# Patient Record
Sex: FEMALE | Race: WHITE | NOT HISPANIC OR LATINO | URBAN - NONMETROPOLITAN AREA
[De-identification: names, ages, dates, MRNs, and addresses within clinical notes are randomized per-mention and may not be internally consistent; named-entity substitution may affect disease eponyms.]

---

## 2023-03-01 ENCOUNTER — ON CAMPUS - OUTPATIENT (OUTPATIENT)
Dept: URBAN - NONMETROPOLITAN AREA HOSPITAL 34 | Facility: HOSPITAL | Age: 33
End: 2023-03-01
Payer: COMMERCIAL

## 2023-03-01 DIAGNOSIS — K31.89 OTHER DISEASES OF STOMACH AND DUODENUM: ICD-10-CM

## 2023-03-01 PROCEDURE — 43239 EGD BIOPSY SINGLE/MULTIPLE: CPT | Performed by: INTERNAL MEDICINE

## 2023-05-11 ENCOUNTER — OFFICE (OUTPATIENT)
Dept: URBAN - NONMETROPOLITAN AREA CLINIC 1 | Facility: CLINIC | Age: 33
End: 2023-05-11
Payer: COMMERCIAL

## 2023-05-11 VITALS
WEIGHT: 140 LBS | SYSTOLIC BLOOD PRESSURE: 116 MMHG | HEIGHT: 60 IN | HEART RATE: 87 BPM | DIASTOLIC BLOOD PRESSURE: 75 MMHG

## 2023-05-11 DIAGNOSIS — B18.2 CHRONIC VIRAL HEPATITIS C: ICD-10-CM

## 2023-05-11 PROCEDURE — 99213 OFFICE O/P EST LOW 20 MIN: CPT | Performed by: NURSE PRACTITIONER

## 2023-05-11 NOTE — SERVICENOTES
Her Rx was sent to [facility].
She knows to contact me when she receives her medication. I need to see her 1 month after starting treatment.

## 2023-05-11 NOTE — SERVICEHPINOTES
In today to initiate treatment for chronic hepatitis C. She was initially seen by [facility], and has had her preliminary work-up done. She is genotype Ia, treatment naïve, no cirrhosis, hepatitis B and HIV are negative. She is taking omeprazole and I have reminded her that she will need to change the time of her dosing (4 hours apart).  She will stop taking the Carafate, she says hasn't needed it.    Current suboxone treatment.

## 2023-05-12 RX ORDER — VELPATASVIR AND SOFOSBUVIR 100; 400 MG/1; MG/1
400 TABLET, FILM COATED ORAL
Qty: 28 | Refills: 2 | Status: ACTIVE
Start: 2023-05-12

## 2023-06-26 ENCOUNTER — OFFICE (OUTPATIENT)
Dept: URBAN - NONMETROPOLITAN AREA CLINIC 1 | Facility: CLINIC | Age: 33
End: 2023-06-26
Payer: COMMERCIAL

## 2023-06-26 VITALS
HEIGHT: 60 IN | WEIGHT: 140 LBS | HEART RATE: 75 BPM | SYSTOLIC BLOOD PRESSURE: 80 MMHG | DIASTOLIC BLOOD PRESSURE: 60 MMHG

## 2023-06-26 DIAGNOSIS — B18.2 CHRONIC VIRAL HEPATITIS C: ICD-10-CM

## 2023-06-26 DIAGNOSIS — K21.9 GASTRO-ESOPHAGEAL REFLUX DISEASE WITHOUT ESOPHAGITIS: ICD-10-CM

## 2023-06-26 PROCEDURE — 99213 OFFICE O/P EST LOW 20 MIN: CPT | Performed by: NURSE PRACTITIONER

## 2023-06-26 NOTE — SERVICEHPINOTES
She is at the end of her first month with Epclusa, and doing very well. She has had a little bit of GI upset and felt irritable for the first week or so. Otherwise, she feels good. She takes her Epclusa at bedtime, and her omeprazole first thing in the morning. She likes [facility]. I will check her CBC and CMP today, otherwise continue current plan of care.

## 2023-06-27 LAB
CBC, PLATELET, NO DIFFERENTIAL: HEMATOCRIT: 42 % (ref 34–46.6)
CBC, PLATELET, NO DIFFERENTIAL: HEMOGLOBIN: 14.4 G/DL (ref 11.1–15.9)
CBC, PLATELET, NO DIFFERENTIAL: MCH: 34.4 PG — HIGH (ref 26.6–33)
CBC, PLATELET, NO DIFFERENTIAL: MCHC: 34.3 G/DL (ref 31.5–35.7)
CBC, PLATELET, NO DIFFERENTIAL: MCV: 100 FL — HIGH (ref 79–97)
CBC, PLATELET, NO DIFFERENTIAL: NRBC: (no result)
CBC, PLATELET, NO DIFFERENTIAL: PLATELETS: 220 X10E3/UL (ref 150–450)
CBC, PLATELET, NO DIFFERENTIAL: RBC: 4.19 X10E6/UL (ref 3.77–5.28)
CBC, PLATELET, NO DIFFERENTIAL: RDW: 11.8 % (ref 11.7–15.4)
CBC, PLATELET, NO DIFFERENTIAL: WBC: 7 X10E3/UL (ref 3.4–10.8)
COMP. METABOLIC PANEL (14): A/G RATIO: 1.9 (ref 1.2–2.2)
COMP. METABOLIC PANEL (14): ALBUMIN: 4.5 G/DL (ref 3.8–4.8)
COMP. METABOLIC PANEL (14): ALKALINE PHOSPHATASE: 76 IU/L (ref 44–121)
COMP. METABOLIC PANEL (14): ALT (SGPT): 11 IU/L (ref 0–32)
COMP. METABOLIC PANEL (14): AST (SGOT): 24 IU/L (ref 0–40)
COMP. METABOLIC PANEL (14): BILIRUBIN, TOTAL: 0.2 MG/DL (ref 0–1.2)
COMP. METABOLIC PANEL (14): BUN/CREATININE RATIO: 15 (ref 9–23)
COMP. METABOLIC PANEL (14): BUN: 11 MG/DL (ref 6–20)
COMP. METABOLIC PANEL (14): CALCIUM: 9.7 MG/DL (ref 8.7–10.2)
COMP. METABOLIC PANEL (14): CARBON DIOXIDE, TOTAL: 24 MMOL/L (ref 20–29)
COMP. METABOLIC PANEL (14): CHLORIDE: 105 MMOL/L (ref 96–106)
COMP. METABOLIC PANEL (14): CREATININE: 0.75 MG/DL (ref 0.57–1)
COMP. METABOLIC PANEL (14): EGFR: 108 ML/MIN/1.73 (ref 59–?)
COMP. METABOLIC PANEL (14): GLOBULIN, TOTAL: 2.4 G/DL (ref 1.5–4.5)
COMP. METABOLIC PANEL (14): GLUCOSE: 65 MG/DL — LOW (ref 70–99)
COMP. METABOLIC PANEL (14): POTASSIUM: 5.2 MMOL/L (ref 3.5–5.2)
COMP. METABOLIC PANEL (14): PROTEIN, TOTAL: 6.9 G/DL (ref 6–8.5)
COMP. METABOLIC PANEL (14): SODIUM: 144 MMOL/L (ref 134–144)

## 2023-09-20 ENCOUNTER — OFFICE (OUTPATIENT)
Dept: URBAN - NONMETROPOLITAN AREA CLINIC 1 | Facility: CLINIC | Age: 33
End: 2023-09-20
Payer: COMMERCIAL

## 2023-09-20 VITALS
HEIGHT: 60 IN | HEART RATE: 65 BPM | DIASTOLIC BLOOD PRESSURE: 66 MMHG | WEIGHT: 135 LBS | SYSTOLIC BLOOD PRESSURE: 111 MMHG

## 2023-09-20 DIAGNOSIS — B18.2 CHRONIC VIRAL HEPATITIS C: ICD-10-CM

## 2023-09-20 PROCEDURE — 99212 OFFICE O/P EST SF 10 MIN: CPT | Performed by: NURSE PRACTITIONER

## 2023-09-20 NOTE — SERVICEHPINOTES
No charge for today.  My plan was to have her back end-November, but she was told by nursing to come in today.  She has completed her Epclusa, will be due for SVR end- November.

## 2023-11-27 ENCOUNTER — OFFICE (OUTPATIENT)
Dept: URBAN - NONMETROPOLITAN AREA CLINIC 1 | Facility: CLINIC | Age: 33
End: 2023-11-27
Payer: COMMERCIAL

## 2023-11-27 VITALS
HEART RATE: 76 BPM | HEIGHT: 60 IN | WEIGHT: 132 LBS | SYSTOLIC BLOOD PRESSURE: 124 MMHG | DIASTOLIC BLOOD PRESSURE: 74 MMHG

## 2023-11-27 DIAGNOSIS — G43.909 MIGRAINE, UNSPECIFIED, NOT INTRACTABLE, WITHOUT STATUS MIGRA: ICD-10-CM

## 2023-11-27 DIAGNOSIS — K21.9 GASTRO-ESOPHAGEAL REFLUX DISEASE WITHOUT ESOPHAGITIS: ICD-10-CM

## 2023-11-27 DIAGNOSIS — B18.2 CHRONIC VIRAL HEPATITIS C: ICD-10-CM

## 2023-11-27 PROCEDURE — 99213 OFFICE O/P EST LOW 20 MIN: CPT | Performed by: NURSE PRACTITIONER

## 2023-11-27 NOTE — SERVICENOTES
She does not have cirrhosis.   If her PCR is negative, she will be cured, and will not need further follow up.

## 2023-11-27 NOTE — SERVICEHPINOTES
She comes in today for a follow-up of her blood work.  She finished 12 weeks of Epclusa for chronic hepatitis-C and is due now to check for SVR.  She feels well,  finished her prescription in August.  Had very few or no side effects from treatment. her chronic illnesses include GERD, and migraine headaches.

## 2023-11-29 LAB
HCV RT-PCR, QUANT (NON-GRAPH): HCV LOG10: (no result)
HCV RT-PCR, QUANT (NON-GRAPH): HEPATITIS C QUANTITATION: (no result) IU/ML
HCV RT-PCR, QUANT (NON-GRAPH): TEST INFORMATION: (no result)